# Patient Record
(demographics unavailable — no encounter records)

---

## 2025-01-14 NOTE — REASON FOR VISIT
[Initial Consultation] : an initial consultation for [Parents] : parents [Mother] : mother [FreeTextEntry3] : fetal follow up (prominent Eustachian valve).

## 2025-01-14 NOTE — PHYSICAL EXAM
[General Appearance - Alert] : alert [General Appearance - In No Acute Distress] : in no acute distress [General Appearance - Well Nourished] : well nourished [General Appearance - Well-Appearing] : well appearing [Attitude Uncooperative] : cooperative [General Appearance - Well Developed] : playful [Appearance Of Head] : the head was normocephalic [Facies] : there were no dysmorphic facial features [Sclera] : the sclera were normal [Outer Ear] : the ears and nose were normal in appearance [Examination Of The Oral Cavity] : mucous membranes were moist and pink [No Cough] : no cough [Auscultation Breath Sounds / Voice Sounds] : breath sounds clear to auscultation bilaterally [Stridor] : no stridor was observed [Respiration, Rhythm And Depth] : normal respiratory rhythm and effort [Normal Chest Appearance] : the chest was normal in appearance [Chest Palpation Tender Sternum] : no chest wall tenderness [Apical Impulse] : quiet precordium with normal apical impulse [Heart Rate And Rhythm] : normal heart rate and rhythm [Heart Sounds] : normal S1 and S2 [No Murmur] : no murmurs  [Heart Sounds Gallop] : no gallops [Heart Sounds Pericardial Friction Rub] : no pericardial rub [Edema] : no edema [Arterial Pulses] : normal upper and lower extremity pulses with no pulse delay [Heart Sounds Click] : no clicks [Capillary Refill Test] : normal capillary refill [Abdomen Soft] : soft [Nondistended] : nondistended [Abdomen Tenderness] : non-tender [] : no hepato-splenomegaly [Nail Clubbing] : no clubbing  or cyanosis of the fingers [Musculoskeletal - Swelling] : no joint swelling or joint tenderness [Motor Tone] : normal muscle strength and tone [Cervical Lymph Nodes Enlarged Anterior] : The anterior cervical nodes were normal [Cervical Lymph Nodes Enlarged Posterior] : The posterior cervical nodes were normal [Skin Turgor] : normal turgor [Demonstrated Behavior - Infant Nonreactive To Parents] : interactive [Mood] : mood and affect were appropriate for age [Demonstrated Behavior] : normal behavior

## 2025-01-14 NOTE — CONSULT LETTER
[Today's Date] : [unfilled] [Name] : Name: [unfilled] [] : : ~~ [Today's Date:] : [unfilled] [Dear  ___:] : Dear Dr. [unfilled]: [Consult] : I had the pleasure of evaluating your patient, [unfilled]. My full evaluation follows. [Consult - Single Provider] : Thank you very much for allowing me to participate in the care of this patient. If you have any questions, please do not hesitate to contact me. [Sincerely,] : Sincerely, [FreeTextEntry4] : Jewel Juarez MD [FreeTextEntry5] : [address] [facility] [FreeTextEntry6] : [place] [address] [FreeTextEnvck7] : Phone# [phone] [de-identified] : Navarro Jimenez MD, FAAP, FACC, JE GARCIA  Chief, Pediatric Cardiology  [facility]  Director, Ambulatory Pediatric Cardiology  [facility]

## 2025-01-14 NOTE — DISCUSSION/SUMMARY
[FreeTextEntry1] : In summary, Garfield has a normal cardiac physical examination with a normal EKG today.  On echocardiography she has no congenital cardiac abnormalities.  The prominent eustachian valve has no obstruction in the right atrium and is considered a normal variant at this stage.  No further cardiac evaluation is needed.  Both the mother and father were present in the office today for this evaluation.  Due to the paternal family history of a dilated aorta, I told the father to encourage a relative with a dilated aorta to obtain genetic testing.  If an abnormality is found on genetic testing, then the father could be checked for that abnormality (he said that a year ago he had a normal echocardiogram). [Needs SBE Prophylaxis] : [unfilled] does not need bacterial endocarditis prophylaxis [May participate in all age-appropriate activities] : [unfilled] May participate in all age-appropriate activities.

## 2025-01-14 NOTE — REVIEW OF SYSTEMS
[Nl] : no feeding issues at this time. [] :  [___ Formula] : [unfilled] Formula  [___ ounces/feeding] : ~RODRIGUE garcia/feeding [___ Times/day] : [unfilled] times/day [Acting Fussy] : not acting ~L fussy [Fever] : no fever [Wgt Loss (___ Lbs)] : no recent weight loss [Pallor] : not pale [Discharge] : no discharge [Redness] : no redness [Nasal Discharge] : no nasal discharge [Nasal Stuffiness] : no nasal congestion [Stridor] : no stridor [Cyanosis] : no cyanosis [Edema] : no edema [Diaphoresis] : not diaphoretic [Tachypnea] : not tachypneic [Wheezing] : no wheezing [Cough] : no cough [Being A Poor Eater] : not a poor eater [Vomiting] : no vomiting [Diarrhea] : no diarrhea [Decrease In Appetite] : appetite not decreased [Fainting (Syncope)] : no fainting [Dec Consciousness] :  no decrease in consciousness [Seizure] : no seizures [Hypotonicity (Flaccid)] : not hypotonic [Refusal to Bear Wgt] : normal weight bearing [Puffy Hands/Feet] : no hand/feet puffiness [Rash] : no rash [Hemangioma] : no hemangioma [Jaundice] : no jaundice [Wound problems] : no wound problems [Bruising] : no tendency for easy bruising [Swollen Glands] : no lymphadenopathy [Enlarged [place]] : the fontanelle was not enlarged [Hoarse Cry] : no hoarse cry [Failure To Thrive] : no failure to thrive [Vaginal Discharge] : no vaginal discharge [Ambiguous Genitals] : genitals not ambiguous [Dec Urine Output] : no oliguria [Solid Foods] : No solid food at this time

## 2025-01-14 NOTE — CLINICAL NARRATIVE
[Up to Date] : Up to Date [FreeTextEntry2] : Garfield is a 3-month-old female who presents for a cardiac evaluation and follow up from her fetal echocardiogram done at 22w6d which demonstrated a prominent eustachian valve.  Garfield is the product of a full-term IVF pregnancy (donor egg) born via  at [facility] with a birth weight of 7lbs. 11oz. Parents deny observing cyanosis, tachypnea or diaphoresis.  Garfield is alert and thriving both nursing and taking pumped breast milk/Enfamil 5-7 ounces ~ 5 times a day without difficulty. There is no known family history for sudden unexplained cardiac death, rhythm disorders or congenital heart defects.  There are no known allergies, and her immunizations are up to date.  Garfield resides in a smoke free home.

## 2025-01-14 NOTE — CARDIOLOGY SUMMARY
[Today's Date] : [unfilled] [FreeTextEntry1] : Normal sinus rhythm at 140 bpm.  QRS axis +95 degrees.  VA 0.118, QRS 0.058, QTc 0.433.  Normal ventricular voltages and no significant ST or T wave abnormalities.  Motion artifacts.  No preexcitation.  No cardiac ectopy.  [Normal ECG for age.] [FreeTextEntry2] : Summary: 1. Normal study. 2. Prominent Eustachian valve (not obstructive, normal variant). 3. Normal right ventricular morphology with qualitatively normal size and systolic function. 4. Normal left ventricular size, morphology and systolic function. 5. No pericardial effusion.  Segmental Cardiotype, Cardiac Position, and Situs: {S,D,S\} Situs solitus, D-ventricular looping, normally related great arteries. The heart is normally positioned in the left chest with the apex pointing leftward. Systemic Veins: The superior vena cava is confluent with morphologic right atrium. Normal right inferior vena cava connected to morphologic right atrium. Pulmonary Veins: There is no evidence of anomalous pulmonary venous connection. Atria: There is no evidence of an atrial septal defect and intact atrial septum. The right atrium is normal in size. Prominent Eustachian valve. The left atrium is normal in size. Mitral Valve: Normal mitral valve morphology and inflow Doppler profile. No mitral valve regurgitation is seen. Tricuspid Valve: Normal tricuspid valve morphology and inflow Doppler profile. There is physiologic tricuspid valve regurgitation. Left Ventricle: Normal left ventricular size and morphology, with normal systolic function. Left ventricular ejection fraction by 5/6 Area x Length is normal at 61 %. Right Ventricle: Normal right ventricular morphology with qualitatively normal size and systolic function. Interventricular Septum: There is no evidence of ventricular septal defect. Conotruncal Anatomy: Normal conotruncal anatomy. Left Ventricular Outflow Tract and Aortic Valve: No evidence of left ventricular outflow tract obstruction. Normal aortic valve morphology and systolic Doppler profile, tricommissural aortic valve and normal aortic valve annulus diameter. No evidence of aortic valve regurgitation. Right Ventricular Outflow Tract and Pulmonary Valve: There is no evidence of right ventricular outflow tract obstruction. Normal pulmonary valve morphology and systolic Doppler profile. Physiologic pulmonary valve regurgitation. Aorta: Normal ascending, transverse and descending aorta, with normal aorta Doppler profiles. There is a normal aortic root. Normal aortic sinotubular junction. Left aortic arch with normal branching pattern of the brachiocephalic arteries. Pulmonary Arteries: Normal main pulmonary artery confluent with the right and left branch pulmonary arteries. Coronary Arteries: Normal origins and proximal courses of the right and left main coronary arteries by two dimensional imaging. Pericardium: No pericardial effusion.  M-mode                              Z-score (where applicable) IVSd:                 0.36 cm       -1.88 LVIDd:                2.46 cm       0.18 LVIDs:                1.32 cm       -1.38 LVPWd:                0.36 cm       -1.49 LV mass (ASE izabela.):    15 g LV mass index:       54.23 g/ht\S\2.7   2-Dimensional                             Z-score (where applicable) LV volume, d (AL)             14 mL LV volume, s (AL)              5 mL LV mass (SAX area-length):    10 g LV mass index:             37.72 g/ht\S\2.7 Ao annulus:                 0.87 cm       -0.17 Ao root sinus, s:           1.22 cm       0.16 Ao ST junct, s:             0.98 cm       0.02  Systolic Function      Z-score (where applicable) LV SF (M-mode):   47 % LV EF (5/6 AL)    61 % -0.58  LV Diastolic Function Lateral annulus e':    0.15 m/s E/e' (mitral lateral): 5.88 Septal annulus e':     0.12 m/s E/e' (mitral septal):  7.25 E/A (mitral inflow):   1.21  Mitral Valve Doppler Peak E:              0.90 m/s Peak A:              0.75 m/s  All Z-scores are from [facility] unless otherwise specified by ([place]) after the value.  Electronically Signed By: Navarro Jimenez M.D. on 1/14/2025 at 12:14:11 PM

## 2025-01-14 NOTE — HISTORY OF PRESENT ILLNESS
[FreeTextEntry1] : Garfield is a 3-month-old female who presents for a cardiac evaluation and follow up from her fetal echocardiogram performed at 22w6d gestation which demonstrated a prominent eustachian valve without obstruction.  Garfield is the product of a full-term IVF pregnancy (donor egg) born via  at [facility] with a birth weight of 7lbs. 11oz. Parents deny observing cyanosis, tachypnea or diaphoresis.  Garfield is alert and thriving, both nursing and taking pumped breast milk/Enfamil 5-7 ounces ~ 5 times a day without difficulty. There is no known family history for sudden unexplained cardiac death, rhythm disorders or congenital heart defects.    Garfield has no known allergies; her immunizations are up to date.  She resides in a smoke free home.